# Patient Record
Sex: FEMALE | Race: WHITE | HISPANIC OR LATINO | URBAN - METROPOLITAN AREA
[De-identification: names, ages, dates, MRNs, and addresses within clinical notes are randomized per-mention and may not be internally consistent; named-entity substitution may affect disease eponyms.]

---

## 2017-02-26 ENCOUNTER — EMERGENCY (EMERGENCY)
Facility: HOSPITAL | Age: 27
LOS: 1 days | Discharge: HOME | End: 2017-02-26
Admitting: EMERGENCY MEDICINE
Payer: MEDICAID

## 2017-02-26 PROCEDURE — 73562 X-RAY EXAM OF KNEE 3: CPT | Mod: 26,50

## 2017-02-26 PROCEDURE — 73562 X-RAY EXAM OF KNEE 3: CPT

## 2017-02-26 PROCEDURE — 99284 EMERGENCY DEPT VISIT MOD MDM: CPT | Mod: 25

## 2017-02-26 PROCEDURE — 81025 URINE PREGNANCY TEST: CPT

## 2017-02-26 PROCEDURE — 99284 EMERGENCY DEPT VISIT MOD MDM: CPT

## 2018-07-06 ENCOUNTER — TRANSCRIPTION ENCOUNTER (OUTPATIENT)
Age: 28
End: 2018-07-06

## 2019-08-21 ENCOUNTER — TRANSCRIPTION ENCOUNTER (OUTPATIENT)
Age: 29
End: 2019-08-21

## 2019-10-08 ENCOUNTER — RESULT REVIEW (OUTPATIENT)
Age: 29
End: 2019-10-08

## 2019-10-08 ENCOUNTER — TRANSCRIPTION ENCOUNTER (OUTPATIENT)
Age: 29
End: 2019-10-08

## 2019-12-29 ENCOUNTER — TRANSCRIPTION ENCOUNTER (OUTPATIENT)
Age: 29
End: 2019-12-29

## 2020-01-04 ENCOUNTER — TRANSCRIPTION ENCOUNTER (OUTPATIENT)
Age: 30
End: 2020-01-04

## 2020-02-25 ENCOUNTER — TRANSCRIPTION ENCOUNTER (OUTPATIENT)
Age: 30
End: 2020-02-25

## 2020-04-25 ENCOUNTER — MESSAGE (OUTPATIENT)
Age: 30
End: 2020-04-25

## 2020-05-03 LAB
SARS-COV-2 IGG SERPL IA-ACNC: 2.6 AU/ML
SARS-COV-2 IGG SERPL QL IA: NEGATIVE

## 2020-06-10 ENCOUNTER — RESULT REVIEW (OUTPATIENT)
Age: 30
End: 2020-06-10

## 2020-06-12 ENCOUNTER — TRANSCRIPTION ENCOUNTER (OUTPATIENT)
Age: 30
End: 2020-06-12

## 2020-07-06 ENCOUNTER — ASOB RESULT (OUTPATIENT)
Age: 30
End: 2020-07-06

## 2020-07-06 ENCOUNTER — TRANSCRIPTION ENCOUNTER (OUTPATIENT)
Age: 30
End: 2020-07-06

## 2020-07-06 ENCOUNTER — APPOINTMENT (OUTPATIENT)
Dept: ANTEPARTUM | Facility: CLINIC | Age: 30
End: 2020-07-06
Payer: COMMERCIAL

## 2020-07-06 PROCEDURE — 36416 COLLJ CAPILLARY BLOOD SPEC: CPT

## 2020-07-06 PROCEDURE — 76801 OB US < 14 WKS SINGLE FETUS: CPT

## 2020-07-06 PROCEDURE — 76813 OB US NUCHAL MEAS 1 GEST: CPT | Mod: 59

## 2020-08-13 ENCOUNTER — EMERGENCY (EMERGENCY)
Facility: HOSPITAL | Age: 30
LOS: 1 days | Discharge: HOME | End: 2020-08-13
Attending: EMERGENCY MEDICINE
Payer: COMMERCIAL

## 2020-08-13 VITALS
OXYGEN SATURATION: 100 % | HEART RATE: 89 BPM | SYSTOLIC BLOOD PRESSURE: 131 MMHG | RESPIRATION RATE: 18 BRPM | TEMPERATURE: 98 F | HEIGHT: 61 IN | DIASTOLIC BLOOD PRESSURE: 83 MMHG | WEIGHT: 132.06 LBS

## 2020-08-13 LAB
ALBUMIN SERPL ELPH-MCNC: 3.7 G/DL — SIGNIFICANT CHANGE UP (ref 3.3–5)
ALP SERPL-CCNC: 67 U/L — SIGNIFICANT CHANGE UP (ref 40–120)
ALT FLD-CCNC: 25 U/L — SIGNIFICANT CHANGE UP (ref 10–45)
ANION GAP SERPL CALC-SCNC: 11 MMOL/L — SIGNIFICANT CHANGE UP (ref 5–17)
APPEARANCE UR: ABNORMAL
AST SERPL-CCNC: 22 U/L — SIGNIFICANT CHANGE UP (ref 10–40)
BACTERIA # UR AUTO: ABNORMAL
BASOPHILS # BLD AUTO: 0.05 K/UL — SIGNIFICANT CHANGE UP (ref 0–0.2)
BASOPHILS NFR BLD AUTO: 0.3 % — SIGNIFICANT CHANGE UP (ref 0–2)
BILIRUB SERPL-MCNC: 0.6 MG/DL — SIGNIFICANT CHANGE UP (ref 0.2–1.2)
BILIRUB UR-MCNC: NEGATIVE — SIGNIFICANT CHANGE UP
BUN SERPL-MCNC: 5 MG/DL — LOW (ref 7–23)
CALCIUM SERPL-MCNC: 9.5 MG/DL — SIGNIFICANT CHANGE UP (ref 8.4–10.5)
CHLORIDE SERPL-SCNC: 103 MMOL/L — SIGNIFICANT CHANGE UP (ref 96–108)
CO2 SERPL-SCNC: 20 MMOL/L — LOW (ref 22–31)
COLOR SPEC: SIGNIFICANT CHANGE UP
CREAT SERPL-MCNC: 0.46 MG/DL — LOW (ref 0.5–1.3)
DIFF PNL FLD: ABNORMAL
EOSINOPHIL # BLD AUTO: 0.15 K/UL — SIGNIFICANT CHANGE UP (ref 0–0.5)
EOSINOPHIL NFR BLD AUTO: 1 % — SIGNIFICANT CHANGE UP (ref 0–6)
EPI CELLS # UR: 0 /HPF — SIGNIFICANT CHANGE UP
GLUCOSE SERPL-MCNC: 84 MG/DL — SIGNIFICANT CHANGE UP (ref 70–99)
GLUCOSE UR QL: NEGATIVE — SIGNIFICANT CHANGE UP
HCT VFR BLD CALC: 37.7 % — SIGNIFICANT CHANGE UP (ref 34.5–45)
HGB BLD-MCNC: 12.8 G/DL — SIGNIFICANT CHANGE UP (ref 11.5–15.5)
HYALINE CASTS # UR AUTO: 1 /LPF — SIGNIFICANT CHANGE UP (ref 0–2)
IMM GRANULOCYTES NFR BLD AUTO: 0.5 % — SIGNIFICANT CHANGE UP (ref 0–1.5)
KETONES UR-MCNC: NEGATIVE — SIGNIFICANT CHANGE UP
LEUKOCYTE ESTERASE UR-ACNC: ABNORMAL
LYMPHOCYTES # BLD AUTO: 1.69 K/UL — SIGNIFICANT CHANGE UP (ref 1–3.3)
LYMPHOCYTES # BLD AUTO: 10.9 % — LOW (ref 13–44)
MAGNESIUM SERPL-MCNC: 1.8 MG/DL — SIGNIFICANT CHANGE UP (ref 1.6–2.6)
MCHC RBC-ENTMCNC: 29.5 PG — SIGNIFICANT CHANGE UP (ref 27–34)
MCHC RBC-ENTMCNC: 34 GM/DL — SIGNIFICANT CHANGE UP (ref 32–36)
MCV RBC AUTO: 86.9 FL — SIGNIFICANT CHANGE UP (ref 80–100)
MONOCYTES # BLD AUTO: 0.89 K/UL — SIGNIFICANT CHANGE UP (ref 0–0.9)
MONOCYTES NFR BLD AUTO: 5.7 % — SIGNIFICANT CHANGE UP (ref 2–14)
NEUTROPHILS # BLD AUTO: 12.63 K/UL — HIGH (ref 1.8–7.4)
NEUTROPHILS NFR BLD AUTO: 81.6 % — HIGH (ref 43–77)
NITRITE UR-MCNC: NEGATIVE — SIGNIFICANT CHANGE UP
NRBC # BLD: 0 /100 WBCS — SIGNIFICANT CHANGE UP (ref 0–0)
PH UR: 7 — SIGNIFICANT CHANGE UP (ref 5–8)
PHOSPHATE SERPL-MCNC: 2.6 MG/DL — SIGNIFICANT CHANGE UP (ref 2.5–4.5)
PLATELET # BLD AUTO: 248 K/UL — SIGNIFICANT CHANGE UP (ref 150–400)
POTASSIUM SERPL-MCNC: 3.6 MMOL/L — SIGNIFICANT CHANGE UP (ref 3.5–5.3)
POTASSIUM SERPL-SCNC: 3.6 MMOL/L — SIGNIFICANT CHANGE UP (ref 3.5–5.3)
PROT SERPL-MCNC: 6.8 G/DL — SIGNIFICANT CHANGE UP (ref 6–8.3)
PROT UR-MCNC: ABNORMAL
RBC # BLD: 4.34 M/UL — SIGNIFICANT CHANGE UP (ref 3.8–5.2)
RBC # FLD: 12.5 % — SIGNIFICANT CHANGE UP (ref 10.3–14.5)
RBC CASTS # UR COMP ASSIST: 3 /HPF — SIGNIFICANT CHANGE UP (ref 0–4)
SARS-COV-2 RNA SPEC QL NAA+PROBE: SIGNIFICANT CHANGE UP
SODIUM SERPL-SCNC: 134 MMOL/L — LOW (ref 135–145)
SP GR SPEC: 1.01 — LOW (ref 1.01–1.02)
UROBILINOGEN FLD QL: NEGATIVE — SIGNIFICANT CHANGE UP
WBC # BLD: 15.48 K/UL — HIGH (ref 3.8–10.5)
WBC # FLD AUTO: 15.48 K/UL — HIGH (ref 3.8–10.5)
WBC UR QL: 66 /HPF — HIGH (ref 0–5)

## 2020-08-13 PROCEDURE — 76815 OB US LIMITED FETUS(S): CPT | Mod: 26

## 2020-08-13 PROCEDURE — 99220: CPT

## 2020-08-13 PROCEDURE — 76770 US EXAM ABDO BACK WALL COMP: CPT | Mod: 26

## 2020-08-13 RX ORDER — ONDANSETRON 8 MG/1
4 TABLET, FILM COATED ORAL ONCE
Refills: 0 | Status: COMPLETED | OUTPATIENT
Start: 2020-08-13 | End: 2020-08-13

## 2020-08-13 RX ORDER — SODIUM CHLORIDE 9 MG/ML
1000 INJECTION INTRAMUSCULAR; INTRAVENOUS; SUBCUTANEOUS ONCE
Refills: 0 | Status: COMPLETED | OUTPATIENT
Start: 2020-08-13 | End: 2020-08-13

## 2020-08-13 RX ORDER — ACETAMINOPHEN 500 MG
975 TABLET ORAL EVERY 6 HOURS
Refills: 0 | Status: DISCONTINUED | OUTPATIENT
Start: 2020-08-13 | End: 2020-08-17

## 2020-08-13 RX ORDER — SODIUM CHLORIDE 9 MG/ML
1000 INJECTION INTRAMUSCULAR; INTRAVENOUS; SUBCUTANEOUS
Refills: 0 | Status: DISCONTINUED | OUTPATIENT
Start: 2020-08-13 | End: 2020-08-17

## 2020-08-13 RX ORDER — ACETAMINOPHEN 500 MG
975 TABLET ORAL ONCE
Refills: 0 | Status: COMPLETED | OUTPATIENT
Start: 2020-08-13 | End: 2020-08-13

## 2020-08-13 RX ORDER — CEFTRIAXONE 500 MG/1
1000 INJECTION, POWDER, FOR SOLUTION INTRAMUSCULAR; INTRAVENOUS EVERY 12 HOURS
Refills: 0 | Status: DISCONTINUED | OUTPATIENT
Start: 2020-08-13 | End: 2020-08-17

## 2020-08-13 RX ADMIN — Medication 975 MILLIGRAM(S): at 22:58

## 2020-08-13 RX ADMIN — CEFTRIAXONE 1000 MILLIGRAM(S): 500 INJECTION, POWDER, FOR SOLUTION INTRAMUSCULAR; INTRAVENOUS at 14:56

## 2020-08-13 RX ADMIN — Medication 975 MILLIGRAM(S): at 14:03

## 2020-08-13 RX ADMIN — SODIUM CHLORIDE 1000 MILLILITER(S): 9 INJECTION INTRAMUSCULAR; INTRAVENOUS; SUBCUTANEOUS at 14:03

## 2020-08-13 RX ADMIN — Medication 975 MILLIGRAM(S): at 14:57

## 2020-08-13 RX ADMIN — ONDANSETRON 4 MILLIGRAM(S): 8 TABLET, FILM COATED ORAL at 14:03

## 2020-08-13 RX ADMIN — CEFTRIAXONE 100 MILLIGRAM(S): 500 INJECTION, POWDER, FOR SOLUTION INTRAMUSCULAR; INTRAVENOUS at 14:05

## 2020-08-13 RX ADMIN — SODIUM CHLORIDE 1000 MILLILITER(S): 9 INJECTION INTRAMUSCULAR; INTRAVENOUS; SUBCUTANEOUS at 16:43

## 2020-08-13 RX ADMIN — SODIUM CHLORIDE 150 MILLILITER(S): 9 INJECTION INTRAMUSCULAR; INTRAVENOUS; SUBCUTANEOUS at 22:19

## 2020-08-13 RX ADMIN — SODIUM CHLORIDE 150 MILLILITER(S): 9 INJECTION INTRAMUSCULAR; INTRAVENOUS; SUBCUTANEOUS at 16:48

## 2020-08-13 RX ADMIN — Medication 975 MILLIGRAM(S): at 22:19

## 2020-08-13 NOTE — ED ADULT TRIAGE NOTE - CHIEF COMPLAINT QUOTE
flank pain, hematuria,  frequent urination since yesterday, history of pyelonephritis, 17 weeks pregnant, denies vaginal bleed

## 2020-08-13 NOTE — ED PROVIDER NOTE - FAMILY HISTORY
Father  Still living? Yes, Estimated age: Age Unknown  Family history of hypertension, Age at diagnosis: Age Unknown  Family history of hyperlipidemia, Age at diagnosis: Age Unknown  Family history of diabetes mellitus in father, Age at diagnosis: Age Unknown

## 2020-08-13 NOTE — ED CDU PROVIDER INITIAL DAY NOTE - MEDICAL DECISION MAKING DETAILS
Armida Macario MD  29 yr old female 17 weeks and 6 days pregnant with UA and US consistent with a UTI concern for pyelonephritis, specially in a pregnant patient will treat with ceftriaxone and place in CDU for further evaluation, treatment, and ressessment.

## 2020-08-13 NOTE — ED PROVIDER NOTE - ATTENDING CONTRIBUTION TO CARE
I performed a history and physical exam of the patient and discussed their management with the ACP. I reviewed the ACP's note and agree with the documented findings and plan of care.  Armida Macario MD

## 2020-08-13 NOTE — ED CDU PROVIDER DISPOSITION NOTE - NSFOLLOWUPINSTRUCTIONS_ED_ALL_ED_FT
1) Follow-up with your primary care provider within 2-3 days.     2) Bring all printed results to discuss with your PCP.    3) Take your antibiotic as directed. Finish the full course of antibiotics do not skip doses or stop early if you start feeling better.    4) Pain can be managed with Acetaminophen (aka Tylenol) 1000mg (2 extra strength tablets) every 6 hours and/or ibuprofen (aka Motrin or Advil) 400mg (2 regular strength tablets) every 6 hours.    5) Continue to take all other medications as directed.    6) Return to the emergency room immediately if your symptoms worsen or persist, or if you have fever, chills, worsening flank pain despite pain medications, inability to urinate, vomiting, unable to keep food or water down, loss of consciousness (passing out), rashes, or if any other concerning or questionable symptoms. 1) Follow-up with your primary care provider and OBGYN within 2-3 days.     2) Bring all printed results to discuss with your PCP.    3) Take your antibiotic as directed. Finish the full course of antibiotics do not skip doses or stop early if you start feeling better.    4) Pain can be managed with Acetaminophen (aka Tylenol) 1000mg (2 extra strength tablets) every 6 hours    5) Continue to take all other medications as directed.    6) Return to the emergency room immediately if your symptoms worsen or persist, or if you have fever, chills, worsening flank pain despite pain medications, inability to urinate, vomiting, unable to keep food or water down, loss of consciousness (passing out), rashes, or if any other concerning or questionable symptoms.

## 2020-08-13 NOTE — ED CDU PROVIDER DISPOSITION NOTE - PATIENT PORTAL LINK FT
You can access the FollowMyHealth Patient Portal offered by [facility] by registering at the following website: [email or web address]. By joining [facility]’s FollowMyHealth portal, you will also be able to view your health information using other applications (apps) compatible with our system.

## 2020-08-13 NOTE — ED PROVIDER NOTE - PROGRESS NOTE DETAILS
MARIAN Rodriguez: paged OB Dr. Bea Grover Oh awaiting call back MARIAN Rodriguez: spoke to OB Dr. Bea Gutierres who agreed with the choice of IV abx, w/u and further monitoring in CDU. Made patient aware discussed case with her OB and she agrees with plan MARIAN Rodriguez: improvement of nausea and pain after tylenol and zofran given. pelvic US single viable intrauterine pregnancy and US kidneys shows no hydro and debris in bladder consistent with UTI. pending COVID results and then dispo to CDU Armida Macario MD  UA and US consistent with a UTI concern for pyelonephritis, specially in a pregnant patient will treat with ceftriaxone and place in CDU for further evaluation and treatment. Patient was signed out to Dr. Stern, pending the Covid results before the patient can be palced in the CDU.

## 2020-08-13 NOTE — ED PROVIDER NOTE - OBJECTIVE STATEMENT
28 y/o female with no PMHx  currently 17 weeks 6 days pregnant no PSx presented to the c/o bilateral flank pain, nausea, dysuria, urinary freq, and hematuria x1 day. Patient reports the pain is severe 10/10 "feels sharp shooting" "like contractions". Has not taken medication for pain or previous antibiotics. Feels similar to pyelo in past pregnancies. 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 Patient denied CP, SOB, vomiting, diarrhea, fever chills, cough, headache, dizziness, weakness, vaginal bleeding, pelvic pain. Mother has no medical problems. Father PMHx HTN, HLD, DM    Obstetrics: Bea Gutierres MD - [facility]

## 2020-08-13 NOTE — ED CDU PROVIDER DISPOSITION NOTE - ATTENDING CONTRIBUTION TO CARE
I have personally performed a face to face medical and diagnostic evaluation of the patient. I have discussed with and reviewed the ACP's note and agree with the History, ROS, Physical Exam and MDM unless otherwise indicated. A brief summary of my personal evaluation and impression can be found below.    Note from previous day: 28 y/o female with no PMHx  currently 17 weeks 6 days pregnant no PSx presented to the c/o bilateral flank pain, nausea, dysuria, urinary freq, and hematuria x1 day. Patient reports the pain is severe 10/10 "feels sharp shooting" "like contractions". Has not taken medication for pain or previous antibiotics. Feels similar to pyelo in past pregnancies. 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 Patient denied CP, SOB, vomiting, diarrhea, fever chills, cough, headache, dizziness, weakness, vaginal bleeding, pelvic pain. Mother has no medical problems. Father PMHx HTN, HLD, DM    	Obstetrics: Bea Gutierres MD - [facility]    Pt sent to CDU for c/f pyelonephritis for IV ABx and continued monitoring. On interval pt reports no acute events overnight, feels well, reports APAP has improved pain, tolerated breakfast this morning, feels well and is asking to be discharged. At present, denies n/v/f/c/cp/sob. Denies headache, syncope, lightheadedness, dizziness. Denies chest palpitations, abdominal pain. Denies edema. Denies dysuria, hematuria, BRBPR, tarry stools, diarrhea, constipation.     All other ROS negative, except as above and as per HPI and ROS section.    GEN APPEARANCE: WDWN, alert, non-toxic, NAD  HEAD: Atraumatic.  EYES: PERRLa, EOMI, vision grossly intact.   NECK: Supple  CV: RRR, S1S2, no c/r/m/g. Cap refill <2 seconds. No bruits.   LUNGS: CTAB. No abnormal breath sounds.  ABDOMEN: Soft, NTND. No guarding or rebound.   MSK/EXT: No spinal or extremity point tenderness. No CVA ttp. Pelvis stable. No peripheral edema.  NEURO: Alert, follows commands. Weight bearing normal. Speech normal. Sensation and motor normal x4 extremities.   SKIN: Warm, dry and intact. No rash.  PSYCH: Appropriate    Plan/MDM: 29F no pmh currently 17w preg presented with urinary sx and b/l flank pain with a positive UA c/f pyelonephritis, sent to CDU for IV ABx and monitoring, feels improved overnight tolerated PO, pt requesting discharge, pt to follow up with OBGYN discussed pt on APAP for pain mgmt, will fu w PB and PMD. Pt reassessed at bedside, feels well, pain controlled. Informed of workup in ED, reviewed lab and/or radiology results (when applicable) with patient/caregiver. Informed pt of plan for discharge with instructions to follow up with PMD. Pt/caregiver expressed understanding of plan and agrees with plan for discharge. Strict return precautions discussed with patient in layman's terms, patient demonstrated understanding of return precautions.

## 2020-08-13 NOTE — ED CDU PROVIDER INITIAL DAY NOTE - PROGRESS NOTE DETAILS
Pt still in ED, awaiting results of Covid swab. OB US w/ viable IUP and Renal US with fullness right collecting system w/ bladder debris concerning for cystitis. Will continue plan as previously established and await arrival to cdu   Cecilia Alfaro PA-C Pt arrived in CDU, pt feeling well, states dysuria improved, hematuria resolved, and flank pain greatly improved. Pt denies f/c, n/v/d, VB, LOF. Abd soft, NT, no CVAT. Will continue to monitor.

## 2020-08-13 NOTE — ED CDU PROVIDER DISPOSITION NOTE - CLINICAL COURSE
28 y/o female with no PMHx  currently 17 weeks 6 days pregnant no PSx presented to the c/o bilateral flank pain, nausea, dysuria, urinary freq, and hematuria x1 day. Patient reports the pain is severe 10/10 "feels sharp shooting" "like contractions". Has not taken medication for pain or previous antibiotics. Feels similar to pyelo in past pregnancies. 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 Patient denied CP, SOB, vomiting, diarrhea, fever chills, cough, headache, dizziness, weakness, vaginal bleeding, pelvic pain. Mother has no medical problems. Father PMHx HTN, HLD, DM  	In ED patient labs sig for leukocytosis of 15.4. UA appeared +. Pt started on Ceftriaxone q 12. ED PA spoke to pt GYN who agreed with plan for iv abx and observation. Pending results US Kidney and Fetal US.   //    Obstetrics: Bea Gutierres MD - [facility]  CDU Course: ___ 28 y/o female with no PMHx  currently 17 weeks 6 days pregnant no PSx presented to the c/o bilateral flank pain, nausea, dysuria, urinary freq, and hematuria x1 day. Patient reports the pain is severe 10/10 "feels sharp shooting" "like contractions". Has not taken medication for pain or previous antibiotics. Feels similar to pyelo in past pregnancies. 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 Patient denied CP, SOB, vomiting, diarrhea, fever chills, cough, headache, dizziness, weakness, vaginal bleeding, pelvic pain. Mother has no medical problems. Father PMHx HTN, HLD, DM  	In ED patient labs sig for leukocytosis of 15.4. UA appeared +. Pt started on Ceftriaxone q 12. ED PA spoke to pt GYN who agreed with plan for iv abx and observation. Pending results US Kidney and Fetal US.   //    Obstetrics: Bea Gutierres MD - [facility]  In the CDU patient responded well.  Pain improved.  Leukocytosis resolved.  Patient to follow up with her PMD and OBGYN.

## 2020-08-13 NOTE — ED CDU PROVIDER INITIAL DAY NOTE - OBJECTIVE STATEMENT
30 y/o female with no PMHx  currently 17 weeks 6 days pregnant no PSx presented to the c/o bilateral flank pain, nausea, dysuria, urinary freq, and hematuria x1 day. Patient reports the pain is severe 10/10 "feels sharp shooting" "like contractions". Has not taken medication for pain or previous antibiotics. Feels similar to pyelo in past pregnancies. 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 Patient denied CP, SOB, vomiting, diarrhea, fever chills, cough, headache, dizziness, weakness, vaginal bleeding, pelvic pain. Mother has no medical problems. Father PMHx HTN, HLD, DM  In ED patient labs sig for leukocytosis of 15.4. UA appeared +. Pt started on Ceftriaxone q 12. ED PA spoke to pt GYN who agreed with plan for iv abx and observation. Pending results US Kidney and Fetal US.     Obstetrics: Bea Gutierres MD - [facility]

## 2020-08-13 NOTE — ED PROVIDER NOTE - CLINICAL SUMMARY MEDICAL DECISION MAKING FREE TEXT BOX
Armida Macario MD  29 yr old female 17 weeks 6days pregnant presenting to the ED with bilateral flank pain, with urinary symptoms of dysuria, and frequency, associated with nausea, no fever. R/o pyelonephritis, r/o infected kidney stone; Plan: labs, US to evaluate the pregnancy and the kidneys, UA, UC&S, IV fluids, nausea meds, reassessment.

## 2020-08-14 VITALS
SYSTOLIC BLOOD PRESSURE: 101 MMHG | DIASTOLIC BLOOD PRESSURE: 68 MMHG | OXYGEN SATURATION: 100 % | RESPIRATION RATE: 18 BRPM | TEMPERATURE: 98 F | HEART RATE: 61 BPM

## 2020-08-14 LAB
ALBUMIN SERPL ELPH-MCNC: 3 G/DL — LOW (ref 3.3–5)
ALP SERPL-CCNC: 58 U/L — SIGNIFICANT CHANGE UP (ref 40–120)
ALT FLD-CCNC: 18 U/L — SIGNIFICANT CHANGE UP (ref 10–45)
ANION GAP SERPL CALC-SCNC: 9 MMOL/L — SIGNIFICANT CHANGE UP (ref 5–17)
AST SERPL-CCNC: 16 U/L — SIGNIFICANT CHANGE UP (ref 10–40)
BASOPHILS # BLD AUTO: 0.05 K/UL — SIGNIFICANT CHANGE UP (ref 0–0.2)
BASOPHILS NFR BLD AUTO: 0.5 % — SIGNIFICANT CHANGE UP (ref 0–2)
BILIRUB SERPL-MCNC: 0.3 MG/DL — SIGNIFICANT CHANGE UP (ref 0.2–1.2)
BUN SERPL-MCNC: 6 MG/DL — LOW (ref 7–23)
CALCIUM SERPL-MCNC: 8.2 MG/DL — LOW (ref 8.4–10.5)
CHLORIDE SERPL-SCNC: 107 MMOL/L — SIGNIFICANT CHANGE UP (ref 96–108)
CO2 SERPL-SCNC: 21 MMOL/L — LOW (ref 22–31)
CREAT SERPL-MCNC: 0.55 MG/DL — SIGNIFICANT CHANGE UP (ref 0.5–1.3)
EOSINOPHIL # BLD AUTO: 0.31 K/UL — SIGNIFICANT CHANGE UP (ref 0–0.5)
EOSINOPHIL NFR BLD AUTO: 3.1 % — SIGNIFICANT CHANGE UP (ref 0–6)
GLUCOSE SERPL-MCNC: 87 MG/DL — SIGNIFICANT CHANGE UP (ref 70–99)
HCT VFR BLD CALC: 34.9 % — SIGNIFICANT CHANGE UP (ref 34.5–45)
HGB BLD-MCNC: 11.3 G/DL — LOW (ref 11.5–15.5)
IMM GRANULOCYTES NFR BLD AUTO: 0.5 % — SIGNIFICANT CHANGE UP (ref 0–1.5)
LYMPHOCYTES # BLD AUTO: 2.33 K/UL — SIGNIFICANT CHANGE UP (ref 1–3.3)
LYMPHOCYTES # BLD AUTO: 23.3 % — SIGNIFICANT CHANGE UP (ref 13–44)
MCHC RBC-ENTMCNC: 29 PG — SIGNIFICANT CHANGE UP (ref 27–34)
MCHC RBC-ENTMCNC: 32.4 GM/DL — SIGNIFICANT CHANGE UP (ref 32–36)
MCV RBC AUTO: 89.7 FL — SIGNIFICANT CHANGE UP (ref 80–100)
MONOCYTES # BLD AUTO: 0.78 K/UL — SIGNIFICANT CHANGE UP (ref 0–0.9)
MONOCYTES NFR BLD AUTO: 7.8 % — SIGNIFICANT CHANGE UP (ref 2–14)
NEUTROPHILS # BLD AUTO: 6.5 K/UL — SIGNIFICANT CHANGE UP (ref 1.8–7.4)
NEUTROPHILS NFR BLD AUTO: 64.8 % — SIGNIFICANT CHANGE UP (ref 43–77)
NRBC # BLD: 0 /100 WBCS — SIGNIFICANT CHANGE UP (ref 0–0)
PLATELET # BLD AUTO: 219 K/UL — SIGNIFICANT CHANGE UP (ref 150–400)
POTASSIUM SERPL-MCNC: 4 MMOL/L — SIGNIFICANT CHANGE UP (ref 3.5–5.3)
POTASSIUM SERPL-SCNC: 4 MMOL/L — SIGNIFICANT CHANGE UP (ref 3.5–5.3)
PROT SERPL-MCNC: 5.4 G/DL — LOW (ref 6–8.3)
RBC # BLD: 3.89 M/UL — SIGNIFICANT CHANGE UP (ref 3.8–5.2)
RBC # FLD: 12.7 % — SIGNIFICANT CHANGE UP (ref 10.3–14.5)
SODIUM SERPL-SCNC: 137 MMOL/L — SIGNIFICANT CHANGE UP (ref 135–145)
WBC # BLD: 10.02 K/UL — SIGNIFICANT CHANGE UP (ref 3.8–10.5)
WBC # FLD AUTO: 10.02 K/UL — SIGNIFICANT CHANGE UP (ref 3.8–10.5)

## 2020-08-14 PROCEDURE — 76810 OB US >/= 14 WKS ADDL FETUS: CPT

## 2020-08-14 PROCEDURE — 96375 TX/PRO/DX INJ NEW DRUG ADDON: CPT

## 2020-08-14 PROCEDURE — 85027 COMPLETE CBC AUTOMATED: CPT

## 2020-08-14 PROCEDURE — 81001 URINALYSIS AUTO W/SCOPE: CPT

## 2020-08-14 PROCEDURE — 99217: CPT

## 2020-08-14 PROCEDURE — 87186 SC STD MICRODIL/AGAR DIL: CPT

## 2020-08-14 PROCEDURE — 96365 THER/PROPH/DIAG IV INF INIT: CPT

## 2020-08-14 PROCEDURE — G0378: CPT

## 2020-08-14 PROCEDURE — U0003: CPT

## 2020-08-14 PROCEDURE — 96361 HYDRATE IV INFUSION ADD-ON: CPT

## 2020-08-14 PROCEDURE — 76770 US EXAM ABDO BACK WALL COMP: CPT

## 2020-08-14 PROCEDURE — 80053 COMPREHEN METABOLIC PANEL: CPT

## 2020-08-14 PROCEDURE — 87086 URINE CULTURE/COLONY COUNT: CPT

## 2020-08-14 PROCEDURE — 99284 EMERGENCY DEPT VISIT MOD MDM: CPT | Mod: 25

## 2020-08-14 PROCEDURE — 84100 ASSAY OF PHOSPHORUS: CPT

## 2020-08-14 PROCEDURE — 83735 ASSAY OF MAGNESIUM: CPT

## 2020-08-14 RX ORDER — CEPHALEXIN 500 MG
1 CAPSULE ORAL
Qty: 27 | Refills: 0
Start: 2020-08-14 | End: 2020-08-22

## 2020-08-14 RX ADMIN — SODIUM CHLORIDE 150 MILLILITER(S): 9 INJECTION INTRAMUSCULAR; INTRAVENOUS; SUBCUTANEOUS at 06:16

## 2020-08-14 RX ADMIN — CEFTRIAXONE 100 MILLIGRAM(S): 500 INJECTION, POWDER, FOR SOLUTION INTRAMUSCULAR; INTRAVENOUS at 01:45

## 2020-08-14 RX ADMIN — Medication 975 MILLIGRAM(S): at 04:20

## 2020-08-14 NOTE — ED CDU PROVIDER SUBSEQUENT DAY NOTE - ATTENDING CONTRIBUTION TO CARE
I have personally performed a face to face medical and diagnostic evaluation of the patient. I have discussed with and reviewed the ACP's note and agree with the History, ROS, Physical Exam and MDM unless otherwise indicated. A brief summary of my personal evaluation and impression can be found below.    Note from previous day: 28 y/o female with no PMHx  currently 17 weeks 6 days pregnant no PSx presented to the c/o bilateral flank pain, nausea, dysuria, urinary freq, and hematuria x1 day. Patient reports the pain is severe 10/10 "feels sharp shooting" "like contractions". Has not taken medication for pain or previous antibiotics. Feels similar to pyelo in past pregnancies. 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 Patient denied CP, SOB, vomiting, diarrhea, fever chills, cough, headache, dizziness, weakness, vaginal bleeding, pelvic pain. Mother has no medical problems. Father PMHx HTN, HLD, DM    	Obstetrics: Bea Gutierres MD - [facility]    Pt sent to CDU for c/f pyelonephritis for IV ABx and continued monitoring. On interval pt reports no acute events overnight, feels well, reports APAP has improved pain, tolerated breakfast this morning, feels well and is asking to be discharged. At present, denies n/v/f/c/cp/sob. Denies headache, syncope, lightheadedness, dizziness. Denies chest palpitations, abdominal pain. Denies edema. Denies dysuria, hematuria, BRBPR, tarry stools, diarrhea, constipation.     All other ROS negative, except as above and as per HPI and ROS section.    GEN APPEARANCE: WDWN, alert, non-toxic, NAD  HEAD: Atraumatic.  EYES: PERRLa, EOMI, vision grossly intact.   NECK: Supple  CV: RRR, S1S2, no c/r/m/g. Cap refill <2 seconds. No bruits.   LUNGS: CTAB. No abnormal breath sounds.  ABDOMEN: Soft, NTND. No guarding or rebound.   MSK/EXT: No spinal or extremity point tenderness. No CVA ttp. Pelvis stable. No peripheral edema.  NEURO: Alert, follows commands. Weight bearing normal. Speech normal. Sensation and motor normal x4 extremities.   SKIN: Warm, dry and intact. No rash.  PSYCH: Appropriate    Plan/MDM: 29F no pmh currently 17w preg presented with urinary sx and b/l flank pain with a positive UA c/f pyelonephritis, sent to CDU for IV ABx and monitoring, feels improved overnight tolerated PO, pt requesting discharge, pt to follow up with OBGYN discussed pt on APAP for pain mgmt, will fu w PB and PMD. Pt reassessed at bedside, feels well, pain controlled. Informed of workup in ED, reviewed lab and/or radiology results (when applicable) with patient/caregiver. Informed pt of plan for discharge with instructions to follow up with PMD. Pt/caregiver expressed understanding of plan and agrees with plan for discharge. Strict return precautions discussed with patient in layman's terms, patient demonstrated understanding of return precautions.

## 2020-08-14 NOTE — ED ADULT NURSE REASSESSMENT NOTE - NS ED NURSE REASSESS COMMENT FT1
Report taken from Jessie CONNELLY. States patient had a good night with no complaints. Will continue to monitor.
Pt received from GODWIN Isidro. Pt oriented to CDU & plan of care was discussed. Pt states she is feeling better. Pt denies any flank pain. Pt denies any urgency or frequency but states that she still has some burning with urination. Pt medicated for 5/10 headache. Safety & comfort measures maintained. Call bell in reach. Will continue to monitor.

## 2020-08-14 NOTE — ED CDU PROVIDER SUBSEQUENT DAY NOTE - PHYSICAL EXAMINATION
GEN: Pt in NAD, non-toxic.  PSYCH: Affect appropriate.  EYES: Sclera white w/o injection.   ENT: Head NCAT. MMM. Neck supple FROM.  RESP: CTA b/l, no wheezes, rales, or rhonchi.   CARDIAC: RRR, clear distinct S1, S2, no appreciable murmurs.  ABD: Abdomen gravid, non-tender. No CVAT b/l.  VASC: No edema or calf tenderness.  SKIN: No rashes on the trunk.

## 2020-08-14 NOTE — ED CDU PROVIDER SUBSEQUENT DAY NOTE - PROGRESS NOTE DETAILS
Pt seen at bedside. Pt in NAD. VS stable from last reading. Pt noted recurrence of flank pain requiring Tylenol, will continue to monitor. Patient seen at bedside in NAD.  VSS.  Patient resting comfortably without complaints. Patient reports that she had some pain overnight, which improved with tylenol.  Afebrile. Leukocytosis resolved. Plan for DC this morning.  All lab and radiology findings discussed with patient.  Strict return precautions and follow up instructions provided.  -Fernando Fong PA-C

## 2020-08-15 NOTE — ED POST DISCHARGE NOTE - RESULT SUMMARY
UCX PRELIM >100k E. coli, pt DC on Keflex, pending sensitivities to determine if need for call back vs appropriate care received. -Gonzales Weinberg PA-C

## 2020-09-01 ENCOUNTER — APPOINTMENT (OUTPATIENT)
Dept: ANTEPARTUM | Facility: CLINIC | Age: 30
End: 2020-09-01
Payer: COMMERCIAL

## 2020-09-01 ENCOUNTER — ASOB RESULT (OUTPATIENT)
Age: 30
End: 2020-09-01

## 2020-09-01 PROCEDURE — 76817 TRANSVAGINAL US OBSTETRIC: CPT

## 2020-09-01 PROCEDURE — 76811 OB US DETAILED SNGL FETUS: CPT

## 2020-12-18 ENCOUNTER — ASOB RESULT (OUTPATIENT)
Age: 30
End: 2020-12-18

## 2020-12-18 ENCOUNTER — APPOINTMENT (OUTPATIENT)
Dept: ANTEPARTUM | Facility: CLINIC | Age: 30
End: 2020-12-18
Payer: COMMERCIAL

## 2020-12-18 PROCEDURE — 76816 OB US FOLLOW-UP PER FETUS: CPT

## 2020-12-18 PROCEDURE — 99072 ADDL SUPL MATRL&STAF TM PHE: CPT

## 2020-12-26 ENCOUNTER — OUTPATIENT (OUTPATIENT)
Dept: OUTPATIENT SERVICES | Facility: HOSPITAL | Age: 30
LOS: 1 days | End: 2020-12-26
Payer: COMMERCIAL

## 2020-12-26 VITALS
TEMPERATURE: 99 F | HEART RATE: 85 BPM | DIASTOLIC BLOOD PRESSURE: 79 MMHG | RESPIRATION RATE: 19 BRPM | SYSTOLIC BLOOD PRESSURE: 120 MMHG

## 2020-12-26 VITALS
RESPIRATION RATE: 16 BRPM | TEMPERATURE: 98 F | DIASTOLIC BLOOD PRESSURE: 79 MMHG | SYSTOLIC BLOOD PRESSURE: 120 MMHG | HEART RATE: 58 BPM

## 2020-12-26 DIAGNOSIS — O26.899 OTHER SPECIFIED PREGNANCY RELATED CONDITIONS, UNSPECIFIED TRIMESTER: ICD-10-CM

## 2020-12-26 DIAGNOSIS — Z3A.00 WEEKS OF GESTATION OF PREGNANCY NOT SPECIFIED: ICD-10-CM

## 2020-12-26 PROCEDURE — G0463: CPT

## 2020-12-26 PROCEDURE — 59025 FETAL NON-STRESS TEST: CPT

## 2020-12-26 NOTE — OB PROVIDER TRIAGE NOTE - NSHPPHYSICALEXAM_GEN_ALL_CORE
Objective  – Vital Signs  Vital Signs Last 24 Hrs  T(C): 36.9 (26 Dec 2020 20:23), Max: 37.0 (26 Dec 2020 18:43)  T(F): 98.42 (26 Dec 2020 20:23), Max: 98.6 (26 Dec 2020 18:43)  HR: 58 (26 Dec 2020 20:23) (55 - 85)  BP: 120/79 (26 Dec 2020 20:23) (120/79 - 120/79)  RR: 16 (26 Dec 2020 20:23) (16 - 19)  SpO2: 98% (26 Dec 2020 20:19) (97% - 100%)    – PE:   CV: RRR  Pulm: breathing comfortably on RA  Abd: gravid, nontender  Extr: moving all extremities with ease  – FHT: baseline 135, mod variability, +accels, -decels  – [place]: irritability  – EFW: 2807g by sono  – [place]: vertex

## 2020-12-26 NOTE — OB PROVIDER TRIAGE NOTE - HISTORY OF PRESENT ILLNESS
Backnote from 6pm  clinical responsibilities     Triage H&P    Subjective  HPI: 31yo  @37+1 presents for chief concern of decreased fetal movement. She states that she has not felt fetal movement since 10pm on . However, since presenting to L&D, she notes that she has felt fetal movement. -LOF. -CTXs. -VB. Pt denies any other concerns.    – PNC: Denies prenatal issues. GBS unknown, but GBS positive in prior pregnancies.  EFW 2807g   – OBHx: 2007-1st trimester ETOP s/p D&C, 2016-SAB @7wk, no D&C, 2016-, 5#8oz  – GynHx: HPV+, s/p colposcopy  – PMH: denies  – PSH: D&C  – Psych: situational anxiety, denies SI/HI   – Social: denies   – Meds: none, does not take PNV because she forgets to take them daily  – Allergies: NKDA  – Will accept blood transfusions? Yes

## 2020-12-26 NOTE — OB PROVIDER TRIAGE NOTE - NSOBPROVIDERNOTE_OBGYN_ALL_OB_FT
Assessment: 29yo  @37+1 presents for chief concern of decreased fetal movement. While in triage, patient noted that she has felt fetal movement. Patient stable and doing well. No prenatal issues. GBS unknown.    Plan  1. NST reactive, BPP   2. Patient stable, not in labor  3. Patient stable for dispo to home  4. Labor precautions: kick counts, return to L&D if experiencing severely painful contractions, decreased fetal movement, leakage of fluid, or vaginal bleeding    Patient discussed with attending physician, Dr. Coronel.  Iliana Cortez MD PGY1

## 2020-12-26 NOTE — OB PROVIDER TRIAGE NOTE - PLAN OF CARE
Labor precautions: kick counts, return to L&D if experiencing severely painful contractions, decreased fetal movement, leakage of fluid, or vaginal bleeding

## 2021-01-06 ENCOUNTER — TRANSCRIPTION ENCOUNTER (OUTPATIENT)
Age: 31
End: 2021-01-06

## 2021-01-09 ENCOUNTER — INPATIENT (INPATIENT)
Facility: HOSPITAL | Age: 31
LOS: 1 days | Discharge: HOME | End: 2021-01-11
Attending: OBSTETRICS & GYNECOLOGY | Admitting: OBSTETRICS & GYNECOLOGY
Payer: COMMERCIAL

## 2021-01-09 VITALS — HEART RATE: 67 BPM | SYSTOLIC BLOOD PRESSURE: 127 MMHG | DIASTOLIC BLOOD PRESSURE: 79 MMHG

## 2021-01-09 DIAGNOSIS — Z34.80 ENCOUNTER FOR SUPERVISION OF OTHER NORMAL PREGNANCY, UNSPECIFIED TRIMESTER: ICD-10-CM

## 2021-01-09 DIAGNOSIS — O26.899 OTHER SPECIFIED PREGNANCY RELATED CONDITIONS, UNSPECIFIED TRIMESTER: ICD-10-CM

## 2021-01-09 DIAGNOSIS — Z3A.00 WEEKS OF GESTATION OF PREGNANCY NOT SPECIFIED: ICD-10-CM

## 2021-01-09 LAB
BASOPHILS # BLD AUTO: 0.02 K/UL — SIGNIFICANT CHANGE UP (ref 0–0.2)
BASOPHILS NFR BLD AUTO: 0.2 % — SIGNIFICANT CHANGE UP (ref 0–2)
BLD GP AB SCN SERPL QL: NEGATIVE — SIGNIFICANT CHANGE UP
EOSINOPHIL # BLD AUTO: 0.14 K/UL — SIGNIFICANT CHANGE UP (ref 0–0.5)
EOSINOPHIL NFR BLD AUTO: 1.6 % — SIGNIFICANT CHANGE UP (ref 0–6)
HCT VFR BLD CALC: 38.1 % — SIGNIFICANT CHANGE UP (ref 34.5–45)
HGB BLD-MCNC: 12.7 G/DL — SIGNIFICANT CHANGE UP (ref 11.5–15.5)
IMM GRANULOCYTES NFR BLD AUTO: 0.6 % — SIGNIFICANT CHANGE UP (ref 0–1.5)
LYMPHOCYTES # BLD AUTO: 1.57 K/UL — SIGNIFICANT CHANGE UP (ref 1–3.3)
LYMPHOCYTES # BLD AUTO: 17.9 % — SIGNIFICANT CHANGE UP (ref 13–44)
MCHC RBC-ENTMCNC: 28.5 PG — SIGNIFICANT CHANGE UP (ref 27–34)
MCHC RBC-ENTMCNC: 33.3 GM/DL — SIGNIFICANT CHANGE UP (ref 32–36)
MCV RBC AUTO: 85.4 FL — SIGNIFICANT CHANGE UP (ref 80–100)
MONOCYTES # BLD AUTO: 0.67 K/UL — SIGNIFICANT CHANGE UP (ref 0–0.9)
MONOCYTES NFR BLD AUTO: 7.7 % — SIGNIFICANT CHANGE UP (ref 2–14)
NEUTROPHILS # BLD AUTO: 6.3 K/UL — SIGNIFICANT CHANGE UP (ref 1.8–7.4)
NEUTROPHILS NFR BLD AUTO: 72 % — SIGNIFICANT CHANGE UP (ref 43–77)
NRBC # BLD: 0 /100 WBCS — SIGNIFICANT CHANGE UP (ref 0–0)
PLATELET # BLD AUTO: 180 K/UL — SIGNIFICANT CHANGE UP (ref 150–400)
RBC # BLD: 4.46 M/UL — SIGNIFICANT CHANGE UP (ref 3.8–5.2)
RBC # FLD: 12.8 % — SIGNIFICANT CHANGE UP (ref 10.3–14.5)
RH IG SCN BLD-IMP: POSITIVE — SIGNIFICANT CHANGE UP
SARS-COV-2 RNA SPEC QL NAA+PROBE: DETECTED
WBC # BLD: 8.75 K/UL — SIGNIFICANT CHANGE UP (ref 3.8–10.5)
WBC # FLD AUTO: 8.75 K/UL — SIGNIFICANT CHANGE UP (ref 3.8–10.5)

## 2021-01-09 RX ORDER — OXYTOCIN 10 UNIT/ML
4 VIAL (ML) INJECTION
Qty: 30 | Refills: 0 | Status: DISCONTINUED | OUTPATIENT
Start: 2021-01-09 | End: 2021-01-10

## 2021-01-09 RX ORDER — OXYTOCIN 10 UNIT/ML
333.33 VIAL (ML) INJECTION
Qty: 20 | Refills: 0 | Status: DISCONTINUED | OUTPATIENT
Start: 2021-01-09 | End: 2021-01-10

## 2021-01-09 RX ORDER — CITRIC ACID/SODIUM CITRATE 300-500 MG
15 SOLUTION, ORAL ORAL EVERY 6 HOURS
Refills: 0 | Status: DISCONTINUED | OUTPATIENT
Start: 2021-01-09 | End: 2021-01-10

## 2021-01-09 RX ORDER — AMPICILLIN TRIHYDRATE 250 MG
1 CAPSULE ORAL EVERY 4 HOURS
Refills: 0 | Status: DISCONTINUED | OUTPATIENT
Start: 2021-01-09 | End: 2021-01-10

## 2021-01-09 RX ORDER — SODIUM CHLORIDE 9 MG/ML
1000 INJECTION, SOLUTION INTRAVENOUS
Refills: 0 | Status: DISCONTINUED | OUTPATIENT
Start: 2021-01-09 | End: 2021-01-10

## 2021-01-09 RX ORDER — AMPICILLIN TRIHYDRATE 250 MG
2 CAPSULE ORAL ONCE
Refills: 0 | Status: COMPLETED | OUTPATIENT
Start: 2021-01-09 | End: 2021-01-09

## 2021-01-09 RX ADMIN — SODIUM CHLORIDE 125 MILLILITER(S): 9 INJECTION, SOLUTION INTRAVENOUS at 20:59

## 2021-01-09 RX ADMIN — Medication 4 MILLIUNIT(S)/MIN: at 20:25

## 2021-01-09 RX ADMIN — Medication 216 GRAM(S): at 20:02

## 2021-01-09 NOTE — OB PROVIDER IHI INDUCTION/AUGMENTATION NOTE - NS_CHECKALL_OBGYN_ALL_OB
Order was written/Contractions pattern was reviewed/FHR was reviewed/Induction / Augmentation was discussed

## 2021-01-09 NOTE — OB PROVIDER H&P - HISTORY OF PRESENT ILLNESS
R1 H&P    Pt is a 31 y/o  at 39w1d coming in with contractions that started this morning at 1am. Q30 minutes lasting for 1-2 minutes. Patient notes that they have spaced out and she is feeling comfortable now. No vaginal bleeding or LOF. Positive fetal movement. Patient would like to stay for induction.  Prenatal course uncomplicated  GBS positive  EFW 3500    OBHx:  G1 2007 eTOP D&C  G2 2016 SAB  G3 2016 FT  5#8  GynHx: h/o HPV+ with normal paps; denies STI's, fibroids, cysts  PMHx: denies  PSHx: D&C   Med: denies  All: NKDA  SH: denies alcohol, tobacco, or drug use  Psych: denies h/o anxiety or depression      EFH: 145, mod araseli, +accels, -decels  [place]: quiet  VE: 3/70/-3  TAUS: vertex    A&P: 31 y/o  at 39w1d for eIOL. GBS positive. EFW 3500.  Labor: admit to L&D  -pitocin 4x4  -amp for gbs ppx  -routine labs  -EFM/toco  -NPO, IV hydration  Fetal: cat 1 tracing, fetal status reassuring      Discussed with Dr. Reji Shelton PGY1 R1 H&P    Pt is a 29 y/o  at 39w1d coming in with contractions that started this morning at 1am. Q30 minutes lasting for 1-2 minutes. Patient notes that they have spaced out and she is feeling comfortable now. No vaginal bleeding or LOF. Positive fetal movement. Patient would like to stay for induction.  Of note, patient's partner was COVID+ on  (asymptomatic). Patient is asymptomatic at this time and was tested on Wednesday and does not know results.  Prenatal course uncomplicated  GBS positive  EFW 3500    OBHx:  G1 2007 eTOP D&C  G2 2016 SAB  G3 2016 FT  5#8  GynHx: h/o HPV+ with normal paps; denies STI's, fibroids, cysts  PMHx: denies  PSHx: D&C   Med: denies  All: NKDA  SH: denies alcohol, tobacco, or drug use  Psych: denies h/o anxiety or depression      EFH: 145, mod araseli, +accels, -decels  [place]: quiet  VE: 3/70/-3  TAUS: vertex    A&P: 29 y/o  at 39w1d for eIOL. GBS positive. EFW 3500.  Labor: admit to L&D  -pitocin 4x4  -amp for gbs ppx  -routine labs  -EFM/toco  -NPO, IV hydration  Fetal: cat 1 tracing, fetal status reassuring      Discussed with Dr. Reji Shelton PGY1

## 2021-01-09 NOTE — OB PROVIDER LABOR PROGRESS NOTE - ASSESSMENT
A/P:   Ms Tate is a healthy multipara, w/ IUP at 39+ wks. She presented earlier today w/ irreg ctxns.  She was not found to be in active labor, however she decided to be admitted for labor induction after discussion or r/b's.  Induction was offered due to favorable cervical exam, gest age > 39 wks, +GBS and need for intrapartum antibiotic prophylaxis.    Currently, maternal and fetal conditions are stable.  she's now in active phase of 1st stage of labor.   will watch FHR changes closely.    once 4 hours after receiving IV abx, will consider AROM

## 2021-01-10 ENCOUNTER — TRANSCRIPTION ENCOUNTER (OUTPATIENT)
Age: 31
End: 2021-01-10

## 2021-01-10 PROBLEM — F41.9 ANXIETY DISORDER, UNSPECIFIED: Chronic | Status: ACTIVE | Noted: 2020-12-26

## 2021-01-10 LAB
SARS-COV-2 IGG SERPL QL IA: NEGATIVE — SIGNIFICANT CHANGE UP
SARS-COV-2 IGM SERPL IA-ACNC: 0.07 INDEX — SIGNIFICANT CHANGE UP
T PALLIDUM AB TITR SER: NEGATIVE — SIGNIFICANT CHANGE UP

## 2021-01-10 RX ORDER — IBUPROFEN 200 MG
600 TABLET ORAL EVERY 6 HOURS
Refills: 0 | Status: DISCONTINUED | OUTPATIENT
Start: 2021-01-10 | End: 2021-01-11

## 2021-01-10 RX ORDER — BENZOCAINE 10 %
1 GEL (GRAM) MUCOUS MEMBRANE EVERY 6 HOURS
Refills: 0 | Status: DISCONTINUED | OUTPATIENT
Start: 2021-01-10 | End: 2021-01-11

## 2021-01-10 RX ORDER — AER TRAVELER 0.5 G/1
1 SOLUTION RECTAL; TOPICAL EVERY 4 HOURS
Refills: 0 | Status: DISCONTINUED | OUTPATIENT
Start: 2021-01-10 | End: 2021-01-11

## 2021-01-10 RX ORDER — GENTAMICIN SULFATE 40 MG/ML
280 VIAL (ML) INJECTION DAILY
Refills: 0 | Status: DISCONTINUED | OUTPATIENT
Start: 2021-01-10 | End: 2021-01-10

## 2021-01-10 RX ORDER — DOCUSATE SODIUM 100 MG
1 CAPSULE ORAL
Qty: 0 | Refills: 0 | DISCHARGE

## 2021-01-10 RX ORDER — SODIUM CHLORIDE 9 MG/ML
3 INJECTION INTRAMUSCULAR; INTRAVENOUS; SUBCUTANEOUS EVERY 8 HOURS
Refills: 0 | Status: DISCONTINUED | OUTPATIENT
Start: 2021-01-10 | End: 2021-01-11

## 2021-01-10 RX ORDER — MAGNESIUM HYDROXIDE 400 MG/1
30 TABLET, CHEWABLE ORAL
Refills: 0 | Status: DISCONTINUED | OUTPATIENT
Start: 2021-01-10 | End: 2021-01-11

## 2021-01-10 RX ORDER — LANOLIN
1 OINTMENT (GRAM) TOPICAL
Qty: 0 | Refills: 0 | DISCHARGE
Start: 2021-01-10

## 2021-01-10 RX ORDER — IBUPROFEN 200 MG
3 TABLET ORAL
Qty: 0 | Refills: 0 | DISCHARGE

## 2021-01-10 RX ORDER — DIPHENHYDRAMINE HCL 50 MG
25 CAPSULE ORAL EVERY 6 HOURS
Refills: 0 | Status: DISCONTINUED | OUTPATIENT
Start: 2021-01-10 | End: 2021-01-11

## 2021-01-10 RX ORDER — OXYTOCIN 10 UNIT/ML
333.33 VIAL (ML) INJECTION
Qty: 20 | Refills: 0 | Status: DISCONTINUED | OUTPATIENT
Start: 2021-01-10 | End: 2021-01-11

## 2021-01-10 RX ORDER — ACETAMINOPHEN 500 MG
2 TABLET ORAL
Qty: 0 | Refills: 0 | DISCHARGE

## 2021-01-10 RX ORDER — ACETAMINOPHEN 500 MG
1000 TABLET ORAL ONCE
Refills: 0 | Status: COMPLETED | OUTPATIENT
Start: 2021-01-10 | End: 2021-01-10

## 2021-01-10 RX ORDER — OXYCODONE HYDROCHLORIDE 5 MG/1
5 TABLET ORAL ONCE
Refills: 0 | Status: DISCONTINUED | OUTPATIENT
Start: 2021-01-10 | End: 2021-01-11

## 2021-01-10 RX ORDER — AMPICILLIN TRIHYDRATE 250 MG
CAPSULE ORAL
Refills: 0 | Status: DISCONTINUED | OUTPATIENT
Start: 2021-01-10 | End: 2021-01-10

## 2021-01-10 RX ORDER — OXYCODONE HYDROCHLORIDE 5 MG/1
5 TABLET ORAL
Refills: 0 | Status: DISCONTINUED | OUTPATIENT
Start: 2021-01-10 | End: 2021-01-11

## 2021-01-10 RX ORDER — AMPICILLIN TRIHYDRATE 250 MG
2 CAPSULE ORAL ONCE
Refills: 0 | Status: COMPLETED | OUTPATIENT
Start: 2021-01-10 | End: 2021-01-10

## 2021-01-10 RX ORDER — DIBUCAINE 1 %
1 OINTMENT (GRAM) RECTAL EVERY 6 HOURS
Refills: 0 | Status: DISCONTINUED | OUTPATIENT
Start: 2021-01-10 | End: 2021-01-11

## 2021-01-10 RX ORDER — AMPICILLIN TRIHYDRATE 250 MG
2 CAPSULE ORAL EVERY 6 HOURS
Refills: 0 | Status: DISCONTINUED | OUTPATIENT
Start: 2021-01-10 | End: 2021-01-10

## 2021-01-10 RX ORDER — PRAMOXINE HYDROCHLORIDE 150 MG/15G
1 AEROSOL, FOAM RECTAL EVERY 4 HOURS
Refills: 0 | Status: DISCONTINUED | OUTPATIENT
Start: 2021-01-10 | End: 2021-01-11

## 2021-01-10 RX ORDER — HYDROCORTISONE 1 %
1 OINTMENT (GRAM) TOPICAL EVERY 6 HOURS
Refills: 0 | Status: DISCONTINUED | OUTPATIENT
Start: 2021-01-10 | End: 2021-01-11

## 2021-01-10 RX ORDER — LANOLIN
1 OINTMENT (GRAM) TOPICAL EVERY 6 HOURS
Refills: 0 | Status: DISCONTINUED | OUTPATIENT
Start: 2021-01-10 | End: 2021-01-11

## 2021-01-10 RX ORDER — TETANUS TOXOID, REDUCED DIPHTHERIA TOXOID AND ACELLULAR PERTUSSIS VACCINE, ADSORBED 5; 2.5; 8; 8; 2.5 [IU]/.5ML; [IU]/.5ML; UG/.5ML; UG/.5ML; UG/.5ML
0.5 SUSPENSION INTRAMUSCULAR ONCE
Refills: 0 | Status: DISCONTINUED | OUTPATIENT
Start: 2021-01-10 | End: 2021-01-11

## 2021-01-10 RX ORDER — FERROUS SULFATE 325(65) MG
1 TABLET ORAL
Qty: 0 | Refills: 0 | DISCHARGE

## 2021-01-10 RX ORDER — SIMETHICONE 80 MG/1
1 TABLET, CHEWABLE ORAL
Qty: 0 | Refills: 0 | DISCHARGE
Start: 2021-01-10

## 2021-01-10 RX ORDER — SIMETHICONE 80 MG/1
80 TABLET, CHEWABLE ORAL EVERY 4 HOURS
Refills: 0 | Status: DISCONTINUED | OUTPATIENT
Start: 2021-01-10 | End: 2021-01-11

## 2021-01-10 RX ORDER — ACETAMINOPHEN 500 MG
975 TABLET ORAL
Refills: 0 | Status: DISCONTINUED | OUTPATIENT
Start: 2021-01-10 | End: 2021-01-11

## 2021-01-10 RX ORDER — KETOROLAC TROMETHAMINE 30 MG/ML
30 SYRINGE (ML) INJECTION ONCE
Refills: 0 | Status: DISCONTINUED | OUTPATIENT
Start: 2021-01-10 | End: 2021-01-10

## 2021-01-10 RX ORDER — IBUPROFEN 200 MG
600 TABLET ORAL EVERY 6 HOURS
Refills: 0 | Status: COMPLETED | OUTPATIENT
Start: 2021-01-10 | End: 2021-12-09

## 2021-01-10 RX ADMIN — Medication 975 MILLIGRAM(S): at 21:25

## 2021-01-10 RX ADMIN — Medication 216 GRAM(S): at 01:17

## 2021-01-10 RX ADMIN — Medication 975 MILLIGRAM(S): at 09:38

## 2021-01-10 RX ADMIN — Medication 600 MILLIGRAM(S): at 18:15

## 2021-01-10 RX ADMIN — Medication 216 GRAM(S): at 15:23

## 2021-01-10 RX ADMIN — Medication 1 TABLET(S): at 15:30

## 2021-01-10 RX ADMIN — Medication 216 GRAM(S): at 08:05

## 2021-01-10 RX ADMIN — OXYCODONE HYDROCHLORIDE 5 MILLIGRAM(S): 5 TABLET ORAL at 18:21

## 2021-01-10 RX ADMIN — Medication 500 MILLIGRAM(S): at 03:42

## 2021-01-10 RX ADMIN — Medication 108 GRAM(S): at 00:07

## 2021-01-10 RX ADMIN — MAGNESIUM HYDROXIDE 30 MILLILITER(S): 400 TABLET, CHEWABLE ORAL at 18:21

## 2021-01-10 RX ADMIN — Medication 30 MILLIGRAM(S): at 04:18

## 2021-01-10 RX ADMIN — Medication 975 MILLIGRAM(S): at 15:24

## 2021-01-10 RX ADMIN — Medication 400 MILLIGRAM(S): at 01:17

## 2021-01-10 RX ADMIN — Medication 1000 MILLIUNIT(S)/MIN: at 05:50

## 2021-01-10 NOTE — DISCHARGE NOTE OB - MEDICATION SUMMARY - MEDICATIONS TO TAKE
I will START or STAY ON the medications listed below when I get home from the hospital:    ibuprofen 200 mg oral capsule  -- up to 3 cap(s) by mouth every 6 hours, As Needed  -- Indication: For pain, cramps or fever    acetaminophen 500 mg oral tablet  -- 2 tab(s) by mouth every 6 hours, As Needed  -- Indication: For pain, cramps or fever    lanolin topical ointment  -- 1 application on skin every 6 hours, As needed, nipple soreness  -- Indication: For cracked or sore nipples    Prenatal Multivitamins with Folic Acid 1 mg oral tablet  -- 1 tab(s) by mouth once a day  -- Indication: For vitamins    Slow Fe (as elemental iron) 45 mg oral tablet, extended release  -- 1 tab(s) by mouth once a day  -- Indication: For iron supplement    Colace 100 mg oral capsule  -- 1 cap(s) by mouth 2 times a day, As Needed  -- Indication: For constipation    simethicone 80 mg oral tablet, chewable  -- 1 tab(s) by mouth every 4 hours, As needed, Gas  -- Indication: For gas pain

## 2021-01-10 NOTE — OB PROVIDER LABOR PROGRESS NOTE - ASSESSMENT
29 y/o  at 39w2d in early labor on pitocin. AROM with clear fluid.  - c/w pitocin    d/w Dr. Reji Shelton PGY1

## 2021-01-10 NOTE — DISCHARGE NOTE OB - MATERIALS PROVIDED
[place] Feeding Log/Guide to Postpartum Care/[facility] Hearing Screen Program/Breastfeeding Guide and Packet/Birth Certificate Instructions/Discharge Medication Information for Patients and Families Pocket Guide

## 2021-01-10 NOTE — DISCHARGE NOTE OB - CARE PROVIDER_API CALL
Troy Mendoza)  Obstetrics and Gynecology  [address], [address]  [place] [address]  Phone: [phone]  Fax: [phone]  Follow Up Time:

## 2021-01-10 NOTE — OB PROVIDER LABOR PROGRESS NOTE - NS_OBIHIFHRDETAILS_OBGYN_ALL_OB_FT
130s, mod araseli, +accels, -decels
stable baseline.  + mod variability.  occasional mild variable decels.  category 2 FHR tracing

## 2021-01-10 NOTE — DISCHARGE NOTE OB - HOSPITAL COURSE
Patient delivered vaginally on 1/10/21 without any complications.  Her postpartum course was uneventful, and she reached appropriate postoperative milestones.  There was no excessive or abnormal vaginal bleeding.  Her uterine tone was normal.   There was no sing or symptom of infections.   Discharge and follow up instructions discussed with patient.

## 2021-01-10 NOTE — OB RN DELIVERY SUMMARY - NS_LABORCHARACTER_OBGYN_ALL_OB
Induction of labor-AROM/Induction of labor-Medicinal/Augmentation of labor/Febrile (>38C)/External electronic FM/Antibiotics in labor/Chorioamnionitis

## 2021-01-10 NOTE — DISCHARGE NOTE OB - CARE PLAN
Principal Discharge DX:	Vaginal delivery  Goal:	recovery  Assessment and plan of treatment:	Patient is stable and doing well upon discharge from the hospital.  She has been counseled regarding postpartum care, modification of her activities and pain management.   She will be seen at outpatient ob/gyn office for postpartum check in about 4 - 6 weeks (or sooner if needed.)

## 2021-01-10 NOTE — OB RN DELIVERY SUMMARY - NS_SEPSISRSKCALC_OBGYN_ALL_OB_FT
EOS calculated successfully. EOS Risk Factor: 0.5/1000 live births ([facility] national incidence); GA=39w2d; Temp=100.94; ROM=3; GBS='Positive'; Antibiotics='Broad spectrum antibiotics 2-3.9 hrs prior to birth'

## 2021-01-10 NOTE — OB PROVIDER DELIVERY SUMMARY - NSPROVIDERDELIVERYNOTE_OBGYN_ALL_OB_FT
Spontaneous vaginal delivery of liveborn infant from OP position. Head, shoulders and body delivered without complication. Infant was suctioned, heavy mec present. Cord was clamped and cut and infant was passed to mother/pediatrician. Placenta was delivered intact with a 3 vessel cord. Fundal massage was given and uterus was found to be firm. Vaginal exam revealed an intact cervix, vaginal walls and sulci. Patient had a first degree laceration in the perineum which was repaired with 2.0 vicryl suture. Excellent hemostasis was noted. Count was correct x2.    Margret Shelton PGY1 Spontaneous vaginal delivery of liveborn infant from OP position. Head, shoulders and body delivered without complication. Infant was suctioned, heavy mec present. Cord was clamped and cut and infant was passed to mother/pediatrician. Placenta was delivered intact with a 3 vessel cord. Fundal massage was given and uterus was found to be firm. Vaginal exam revealed an intact cervix, vaginal walls and sulci. Patient had a first degree laceration in the perineum which was repaired with 2.0 vicryl suture. Excellent hemostasis was noted. Count was correct x2.    Margret Shelton PGY1    Ob Attg Note  I agree w/ above note.  Uncomplicated . small 1st degree perin lac repaired.  Normal blood loss.

## 2021-01-10 NOTE — OB PROVIDER LABOR PROGRESS NOTE - NS_SUBJECTIVE/OBJECTIVE_OBGYN_ALL_OB_FT
Evaluated patient for cervical change. AROM with clear fluid.
Pt became very uncomfortable w/ her uterine contractions.  Epidural anesth administered just recently.  She's now starting to get relief.

## 2021-01-10 NOTE — DISCHARGE NOTE OB - PATIENT PORTAL LINK FT
You can access the FollowMyHealth Patient Portal offered by [facility] by registering at the following website: [email or web address]. By joining [facility]’s FollowMyHealth portal, you will also be able to view your health information using other applications (apps) compatible with our system.

## 2021-01-10 NOTE — DISCHARGE NOTE OB - MEDICATION SUMMARY - MEDICATIONS TO STOP TAKING
I will STOP taking the medications listed below when I get home from the hospital:    dicloxacillin  --  by mouth    Keflex 500 mg oral capsule  -- 1 cap(s) by mouth every 8 hours   -- Finish all this medication unless otherwise directed by prescriber.

## 2021-01-10 NOTE — DISCHARGE NOTE OB - COMMENTS
(T22.184) Keratoconjunct sicca, not specified as Sjogren's, bilateral - Assesment : Examination revealed Dry Eye Syndrome OU. Stable. - Plan : Recommend to continue restasis BID OU and PF Artificial tears 2-3 times daily OU to help wash out allergens. May consider punctal plugs if dryness continues bothersome. Monitor for changes. Advised patient to call our office with decreased vision or increased symptoms.  RV 6 months Exam. follow up with OB appt

## 2021-01-11 VITALS
DIASTOLIC BLOOD PRESSURE: 62 MMHG | RESPIRATION RATE: 18 BRPM | SYSTOLIC BLOOD PRESSURE: 96 MMHG | HEART RATE: 63 BPM | TEMPERATURE: 98 F

## 2021-01-11 PROCEDURE — 59025 FETAL NON-STRESS TEST: CPT

## 2021-01-11 PROCEDURE — 86900 BLOOD TYPING SEROLOGIC ABO: CPT

## 2021-01-11 PROCEDURE — 86769 SARS-COV-2 COVID-19 ANTIBODY: CPT

## 2021-01-11 PROCEDURE — 59050 FETAL MONITOR W/REPORT: CPT

## 2021-01-11 PROCEDURE — 86780 TREPONEMA PALLIDUM: CPT

## 2021-01-11 PROCEDURE — 85025 COMPLETE CBC W/AUTO DIFF WBC: CPT

## 2021-01-11 PROCEDURE — 87635 SARS-COV-2 COVID-19 AMP PRB: CPT

## 2021-01-11 PROCEDURE — 86901 BLOOD TYPING SEROLOGIC RH(D): CPT

## 2021-01-11 PROCEDURE — G0463: CPT

## 2021-01-11 PROCEDURE — 86850 RBC ANTIBODY SCREEN: CPT

## 2021-01-11 PROCEDURE — U0005: CPT

## 2021-01-11 RX ADMIN — Medication 600 MILLIGRAM(S): at 06:02

## 2021-01-11 RX ADMIN — Medication 975 MILLIGRAM(S): at 03:40

## 2021-01-11 RX ADMIN — Medication 600 MILLIGRAM(S): at 12:05

## 2021-01-11 RX ADMIN — Medication 1 TABLET(S): at 12:05

## 2021-01-11 RX ADMIN — Medication 975 MILLIGRAM(S): at 09:13

## 2021-01-11 RX ADMIN — Medication 975 MILLIGRAM(S): at 15:07

## 2021-01-11 RX ADMIN — Medication 600 MILLIGRAM(S): at 00:29

## 2021-01-11 NOTE — PROGRESS NOTE ADULT - ASSESSMENT
A/P: 31yo COVID+ PPD#1 s/p  c/b chorioamnionitis s/p A/G/T.  Patient is stable, afebrile, and doing well post-partum.

## 2021-01-11 NOTE — PROGRESS NOTE ADULT - PROBLEM SELECTOR PLAN 1
- Afebrile since 1am on 1/10/20, continue to trend fever curve  - COVID+, but asymptomatic, continue to monitor   - Pain well controlled, continue current pain regimen  - Increase ambulation, SCDs when not ambulating  - Continue regular diet    Iliana Cortez MD PGY1

## 2021-01-11 NOTE — PROGRESS NOTE ADULT - ATTENDING COMMENTS
Patient seen and examined, agree with above note. Pt doing well. Ambulating well. Tolerating PO. Voiding well and passing flatus. Lochia appropriate. No pain. Bottle feeding. No fevers/chills, SoB, palpitations.     Gen: AAOx3, NAD  Abd: Soft, NT, fundus firm at U   Ext: NT     Intrapartum course complicated by chorioamnionitis, s/p abx therapy. Afebrile since 1/10 1 am. Discharge home with follow up in office in 5 weeks. Advised to continue with quarantine at home 2/2 covid +.

## 2021-01-14 ENCOUNTER — EMERGENCY (EMERGENCY)
Facility: HOSPITAL | Age: 31
LOS: 0 days | Discharge: HOME | End: 2021-01-14
Attending: EMERGENCY MEDICINE
Payer: COMMERCIAL

## 2021-01-14 VITALS
RESPIRATION RATE: 18 BRPM | SYSTOLIC BLOOD PRESSURE: 138 MMHG | OXYGEN SATURATION: 97 % | DIASTOLIC BLOOD PRESSURE: 88 MMHG | HEART RATE: 70 BPM

## 2021-01-14 VITALS — WEIGHT: 139.99 LBS | HEIGHT: 61 IN

## 2021-01-14 DIAGNOSIS — F41.9 ANXIETY DISORDER, UNSPECIFIED: ICD-10-CM

## 2021-01-14 DIAGNOSIS — U07.1 COVID-19: ICD-10-CM

## 2021-01-14 PROCEDURE — 99284 EMERGENCY DEPT VISIT MOD MDM: CPT

## 2021-01-14 PROCEDURE — 99283 EMERGENCY DEPT VISIT LOW MDM: CPT

## 2021-01-14 NOTE — ED STATDOCS - CARE PROVIDER_API CALL
Troy Mendoza)  Obstetrics and Gynecology  [address], [address]  [place] [address]  Phone: [phone]  Fax: [phone]  Follow Up Time:

## 2021-01-14 NOTE — ED STATDOCS - PROGRESS NOTE DETAILS
31 y/o F with PMHx of anxiety presents to the ED sent by OBGYN for eval of perineal wound s/p vaginal delivery on 1/10 with Dr. Mendoza at [facility]. Has 1st degree tear.    Reports stitches came out on 1/12 at home. No fever. Called OB told to come for evaluation. NKELI.  Milagro Garcia PA-C I examined patient in room with .  No active bleeding from episiotomy site with proximal perineal wound open.  Will consult GYN.  Milagro Garcia PA-C GYN evaluated patient.  No need for ABX and resuturing.  Follow with Routine outpatient  OB care.  Milagro Garcia PA-C

## 2021-01-14 NOTE — ED STATDOCS - NSFOLLOWUPINSTRUCTIONS_ED_ALL_ED_FT
Wound Dehiscence    WHAT YOU NEED TO KNOW:    Wound dehiscence is when part or all of a wound comes apart. You may need medicine, wound care, surgery, or wound devices to help treat your wound. Wound dehiscence can become life-threatening.     DISCHARGE INSTRUCTIONS:    Seek care immediately if:   •Your heart is beating faster than usual, or you feel dizzy or lightheaded.      •Blood soaks through your bandage.      •You see tissue coming through your wound.       •You feel like your wound is opening up more.       •Your wound oozes yellow or green pus, looks swollen or red, or feels warm.      Contact your healthcare provider or surgeon if:   •You have a fever or chills.      •Your wound leaks fluid or a small amount of blood.      •Your pain gets worse or does not get better after you take pain medicine.      •You have nausea or are vomiting.       •You have questions or concerns about your condition or care.      Medicines:   •Antibiotics help treat a bacterial infection.       •Prescription pain medicine may be given. Ask your healthcare provider how to take this medicine safely. Some prescription pain medicines contain acetaminophen. Do not take other medicines that contain acetaminophen without talking to your healthcare provider. Too much acetaminophen may cause liver damage. Prescription pain medicine may cause constipation. Ask your healthcare provider how to prevent or treat constipation.       •Take your medicine as directed. Contact your healthcare provider if you think your medicine is not helping or if you have side effects. Tell him or her if you are allergic to any medicine. Keep a list of the medicines, vitamins, and herbs you take. Include the amounts, and when and why you take them. Bring the list or the pill bottles to follow-up visits. Carry your medicine list with you in case of an emergency.      Wound care:   •Wash your hands often. Use soap and water. Wash your hands before and after you touch your wound. This will help to prevent an infection.       •Clean your wound as directed. Ask your healthcare provider if it okay to shower or take a bath. Let the soap and water run over your wound. Gently pat the area dry. Look for signs of infection, such as redness, swelling, or pus.       •Change your bandages as directed. Replace bandages after you clean the wound or bathe. Change your bandages when they get wet or dirty. If directed, pack your wound. Change the packing as directed.       •Do not swim or go in hot tubs until your healthcare provider says it is okay. Hot tubs and pools can cause infection and prevent wound healing.       •Wear your binder or splint at all times or as directed. These devices help hold your wound together.       •Use devices as directed to help the wound heal. Your healthcare provider will show you how to care for your wound device.       Self-care:   •Rest as directed. Do not lift anything heavier than 5 pounds. Do not do activities that may put stress on your wound, such as running or sports. Ask your healthcare provider when you can return to your usual activities.       •Eat foods high in protein. Protein will help your wound heal. Protein can be found in lean meat, fish, beans, and low-fat dairy. Your healthcare provider may also recommend certain drinks for added protein.      •Do not smoke. Nicotine and other chemicals in cigarettes and cigars can prevent your wound from healing. Ask your healthcare provider for information if you currently smoke and need help to quit. E-cigarettes or smokeless tobacco still contain nicotine. Talk to your healthcare provider before you use these products.       Follow up with your healthcare provider or surgeon as directed: You will need to return to have your wound checked. Write down your questions so you remember to ask them during your visits.

## 2021-01-14 NOTE — ED STATDOCS - PHYSICAL EXAMINATION
Gen: Well appearing in NAD  Head: NC/AT  Neck: trachea midline  Resp:  No distress  Ext: no deformities  Neuro:  A&O appears non focal  Skin:  Warm and dry as visualized  Psych:  Normal affect and mood  : Exam defered to OBGYN

## 2021-01-14 NOTE — ED ADULT NURSE NOTE - OBJECTIVE STATEMENT
Patient gave birth on January 10th and had a 1st degree tear with suturing.  Wound has not healed properly as per patient.  Patient has also had fevers. Bilateral breast pain.  Not breastfeeding.  Breasts full.

## 2021-01-14 NOTE — ED STATDOCS - CLINICAL SUMMARY MEDICAL DECISION MAKING FREE TEXT BOX
Perineal tear with possible dehiscence. Will consult OBGYN. Perineal tear with possible dehiscence. Will consult OBGYN.      GYN evaluated patient.  No need for ABX and resuturing.  Follow with Routine outpatient  OB care.  Milagro Garcia PA-C

## 2021-01-14 NOTE — ED STATDOCS - PATIENT PORTAL LINK FT
You can access the FollowMyHealth Patient Portal offered by [facility] by registering at the following website: [email or web address]. By joining [employer]’s FollowMyHealth portal, you will also be able to view your health information using other applications (apps) compatible with our system.

## 2021-01-14 NOTE — ED STATDOCS - OBJECTIVE STATEMENT
31 y/o F with PMHx of anxiety presents to the ED sent by OBGYN for eval of perineal wound s/p vaginal delivery on 1/10 with Dr. Mendoza at [facility]. Has 1st degree tear. Reports stitches came out on 1/12 at home. No fever. Called OB told to come for evaluation. NKDA.

## 2021-01-14 NOTE — CONSULT NOTE ADULT - SUBJECTIVE AND OBJECTIVE BOX
29 yo /p  2021 at [facility] presenting with recurring pelvic pain, fever and vaginal bleeding that started on Tuesday. Patient had a 1st degree laceration during delivery and states she felt the stitch fell off. She has been bleeding, passing large clots and soiling up to 6 pads per day. Bleeding has lightened since Tuesday (3-4 pads). Patient has been taking Tyelenol and Motrin every 4-6 hours everyday since Tuesday due to recurring fever and pain. Tmax 101.3 at home.   Of note, patient had chorioamnionitis and received ampicillin/gentamicin intrapartum with an uncomplicated vaginal delivery.   Patient admits to -8/10 pelvic cramping and nausea, chills.   Denies syncope, cp, palpitations, sob, abdominal pain, dysuria.     PMHx: denies  Allergies: denies  OB GYN: uncomplicated , uncomplicated pregnancy, 2 miscarriages ('07, 16). no prior c sections  Surgeries: denies  Social: denies alcohol, smoking or drug use  denies sexual activity ~ 6 weeks   FMHx: fibroids in mother   Meds: tyelenol and motrin for pain and fever     VS: 145/97 T 98.4 F HR 74   Physical Exam:  General: in NAD   Cardio: RRR, +S1/S2  Pulmonary: CTA b/l no RRW   GI: soft, mild tenderness to deep palpation lower abdomen, nondistended, BSX4  : normal lochia, 1st degree vaginal laceration with granulation tissue at 8oclock, no erythema or pus, no fluctuance, stiches not seen     Assessment:  29 yo  s/p  X 5 days likely physiologic post partum vaginal bleeding and pelvic pain. Low grade fever likely from mastitis.     Plan:  - GYN exam showed no abnormalities  - Patient to continue routine post partum care.   - stable for discharge home for routine follow up with OBGYN Dr. Gutierres

## 2021-01-14 NOTE — ED ADULT NURSE NOTE - ISOLATION PROVIDED EDUCATION
Health Maintenance Summary     Topic Due On Due Status Completed On Postpone Until Reason    Pap Smear - Cervical Cancer Screening  Feb 10, 2017 Overdue       Immunization - TDAP Pregnancy  Hidden       IMMUNIZATION - DTaP/Tdap/Td Apr 2, 2023 Not Due Apr 2, 2013      Immunization-Influenza Sep 1, 2017 Postponed  Apr 1, 2018 Patient Refused    Depression Screening Feb 10, 1999 Overdue             Patient is due for topics as listed above, she wishes to proceed at this time, order (s) placed and patient given information .           Patient

## 2021-01-14 NOTE — ED ADULT TRIAGE NOTE - CHIEF COMPLAINT QUOTE
pt a/ox3, c/o "I just gave birth on 1/10/2021 and I was sent by MD Coronel for my stitches opening up". pt denies all other complaints/symptoms at this time.

## 2021-02-15 ENCOUNTER — RESULT REVIEW (OUTPATIENT)
Age: 31
End: 2021-02-15

## 2021-04-10 ENCOUNTER — TRANSCRIPTION ENCOUNTER (OUTPATIENT)
Age: 31
End: 2021-04-10

## 2021-05-10 ENCOUNTER — TRANSCRIPTION ENCOUNTER (OUTPATIENT)
Age: 31
End: 2021-05-10

## 2021-07-07 NOTE — ED CDU PROVIDER DISPOSITION NOTE - NSDCPRINTRESULTS_ED_ALL_ED
Brea Javier, KATELYNN, CDN  pager [phone]/yes
Patient requests all Lab and Radiology Results on their Discharge Instructions

## 2021-09-17 ENCOUNTER — RESULT REVIEW (OUTPATIENT)
Age: 31
End: 2021-09-17

## 2022-04-21 ENCOUNTER — TRANSCRIPTION ENCOUNTER (OUTPATIENT)
Age: 32
End: 2022-04-21

## 2022-08-04 ENCOUNTER — NON-APPOINTMENT (OUTPATIENT)
Age: 32
End: 2022-08-04

## 2022-10-26 NOTE — ED PROVIDER NOTE - CONSTITUTIONAL MENTATION
[FreeTextEntry1] : She is an asymptomatic  32 year old female referred for  a screening colonoscopy. She has a past history  of colon polyps. Her last colonoscopy was 8/24/2019. She denies a family history of colon cancer. 
oriented to person, place, time/situation/awake/alert

## 2023-01-11 NOTE — ED STATDOCS - SKIN [+], MLM

## 2023-08-09 ENCOUNTER — NON-APPOINTMENT (OUTPATIENT)
Age: 33
End: 2023-08-09
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
## 2024-01-04 ENCOUNTER — NON-APPOINTMENT (OUTPATIENT)
Age: 34
End: 2024-01-04

## 2024-01-21 ENCOUNTER — NON-APPOINTMENT (OUTPATIENT)
Age: 34
End: 2024-01-21

## 2024-02-23 ENCOUNTER — NON-APPOINTMENT (OUTPATIENT)
Age: 34
End: 2024-02-23

## 2024-04-08 ENCOUNTER — RESULT REVIEW (OUTPATIENT)
Age: 34
End: 2024-04-08

## 2024-04-08 ENCOUNTER — OUTPATIENT (OUTPATIENT)
Dept: OUTPATIENT SERVICES | Facility: HOSPITAL | Age: 34
LOS: 1 days | End: 2024-04-08
Payer: COMMERCIAL

## 2024-04-08 ENCOUNTER — APPOINTMENT (OUTPATIENT)
Dept: MRI IMAGING | Facility: HOSPITAL | Age: 34
End: 2024-04-08
Payer: COMMERCIAL

## 2024-04-08 ENCOUNTER — APPOINTMENT (OUTPATIENT)
Dept: INTERNAL MEDICINE | Facility: CLINIC | Age: 34
End: 2024-04-08
Payer: COMMERCIAL

## 2024-04-08 ENCOUNTER — NON-APPOINTMENT (OUTPATIENT)
Age: 34
End: 2024-04-08

## 2024-04-08 VITALS
HEIGHT: 61 IN | BODY MASS INDEX: 23.6 KG/M2 | DIASTOLIC BLOOD PRESSURE: 70 MMHG | WEIGHT: 125 LBS | SYSTOLIC BLOOD PRESSURE: 120 MMHG

## 2024-04-08 DIAGNOSIS — Z00.00 ENCOUNTER FOR GENERAL ADULT MEDICAL EXAMINATION W/OUT ABNORMAL FINDINGS: ICD-10-CM

## 2024-04-08 DIAGNOSIS — Z82.69 FAMILY HISTORY OF OTHER DISEASES OF THE MUSCULOSKELETAL SYSTEM AND CONNECTIVE TISSUE: ICD-10-CM

## 2024-04-08 DIAGNOSIS — G43.909 MIGRAINE, UNSPECIFIED, NOT INTRACTABLE, W/OUT STATUS MIGRAINOSUS: ICD-10-CM

## 2024-04-08 DIAGNOSIS — Z87.39 PERSONAL HISTORY OF OTHER DISEASES OF THE MUSCULOSKELETAL SYSTEM AND CONNECTIVE TISSUE: ICD-10-CM

## 2024-04-08 DIAGNOSIS — S43.429A SPRAIN OF UNSPECIFIED ROTATOR CUFF CAPSULE, INITIAL ENCOUNTER: ICD-10-CM

## 2024-04-08 DIAGNOSIS — R51.9 HEADACHE, UNSPECIFIED: ICD-10-CM

## 2024-04-08 DIAGNOSIS — G43.909 MIGRAINE, UNSPECIFIED, NOT INTRACTABLE, WITHOUT STATUS MIGRAINOSUS: ICD-10-CM

## 2024-04-08 DIAGNOSIS — B00.9 HERPESVIRAL INFECTION, UNSPECIFIED: ICD-10-CM

## 2024-04-08 PROCEDURE — G0444 DEPRESSION SCREEN ANNUAL: CPT | Mod: 59

## 2024-04-08 PROCEDURE — 70551 MRI BRAIN STEM W/O DYE: CPT

## 2024-04-08 PROCEDURE — 99202 OFFICE O/P NEW SF 15 MIN: CPT | Mod: 25

## 2024-04-08 PROCEDURE — 93000 ELECTROCARDIOGRAM COMPLETE: CPT | Mod: 59

## 2024-04-08 PROCEDURE — 70551 MRI BRAIN STEM W/O DYE: CPT | Mod: 26

## 2024-04-08 PROCEDURE — 99385 PREV VISIT NEW AGE 18-39: CPT

## 2024-04-08 RX ORDER — NAPROXEN 500 MG/1
500 TABLET ORAL TWICE DAILY
Qty: 20 | Refills: 0 | Status: ACTIVE | COMMUNITY
Start: 2024-04-08 | End: 1900-01-01

## 2024-04-15 PROBLEM — R51.9 HEADACHE, CHRONIC DAILY: Status: ACTIVE | Noted: 2024-04-08

## 2024-04-15 NOTE — PHYSICAL EXAM
[No Acute Distress] : no acute distress [Well Nourished] : well nourished [PERRL] : pupils equal round and reactive to light [EOMI] : extraocular movements intact [Normal Oropharynx] : the oropharynx was normal [Normal TMs] : both tympanic membranes were normal [Normal Nasal Mucosa] : the nasal mucosa was normal [No Lymphadenopathy] : no lymphadenopathy [Supple] : supple [Thyroid Normal, No Nodules] : the thyroid was normal and there were no nodules present [No Accessory Muscle Use] : no accessory muscle use [Clear to Auscultation] : lungs were clear to auscultation bilaterally [Regular Rhythm] : with a regular rhythm [Normal S1, S2] : normal S1 and S2 [No Murmur] : no murmur heard [No Edema] : there was no peripheral edema [Soft] : abdomen soft [Non Tender] : non-tender [Non-distended] : non-distended [No Masses] : no abdominal mass palpated [No HSM] : no HSM [Normal Bowel Sounds] : normal bowel sounds [Normal Supraclavicular Nodes] : no supraclavicular lymphadenopathy [Normal Posterior Cervical Nodes] : no posterior cervical lymphadenopathy [Normal Anterior Cervical Nodes] : no anterior cervical lymphadenopathy [Normal] : the cranial nerves were intact [Sensation Tactile Decrease] : light touch was intact [2+] : left 2+ [Normal Affect] : the affect was normal [Normal Insight/Judgement] : insight and judgment were intact [de-identified] : deferred - had gyn exam 12/23 [de-identified] : normal color and pigmentation o fnevi

## 2024-04-15 NOTE — HEALTH RISK ASSESSMENT
[Patient reported PAP Smear was normal] : Patient reported PAP Smear was normal [HIV Test offered] : HIV Test offered [PapSmearDate] : 12/23

## 2024-04-15 NOTE — ASSESSMENT
[FreeTextEntry1] : 33F c migraine headache her for cpe and cc of chronic daily headache in setting of advil use   [facility] - advised to check BW  physical ECG performed - ECG NSR  pt states utd with gyn exam advised annual optho exam and dental exam    rtc in1 year for cpe or prn

## 2024-04-15 NOTE — HISTORY OF PRESENT ILLNESS
[FreeTextEntry1] : CPE   chronic headache migraine headache fatigue   [de-identified] : Diet: been good  Exercise: regular    has worsening headache for past 6 weeks - almost daily - similar to migraine headache headache is 7-8/10 - take ibuprofen goes down to 2/10 but after ibuprofen wears off starts feelign headache again but doens' ttake anything for it  also started waking her up in the am   Tylenol doens't work for her   migraine headache - top of head - feels achy - usually 2-3 x per month   no tick exposure    states sleep is uninterrupted from 9pm to 4:45 am but not restorative to her  also feels exhausting - has been working6 days a week  only change is has been working more - has not had vacation for 4 months no si or hi

## 2024-04-15 NOTE — REVIEW OF SYSTEMS
[Headache] : headache [Negative] : Integumentary [Dizziness] : no dizziness [Fainting] : no fainting [Confusion] : no confusion [Memory Loss] : no memory loss [Unsteady Walking] : no ataxia [de-identified] : no si or hi

## 2024-04-17 ENCOUNTER — LABORATORY RESULT (OUTPATIENT)
Age: 34
End: 2024-04-17

## 2024-04-19 DIAGNOSIS — R53.83 OTHER FATIGUE: ICD-10-CM

## 2024-04-19 LAB
25(OH)D3 SERPL-MCNC: 81.3 NG/ML
ALBUMIN SERPL ELPH-MCNC: 4.8 G/DL
ALP BLD-CCNC: 61 U/L
ALT SERPL-CCNC: 12 U/L
ANION GAP SERPL CALC-SCNC: 10 MMOL/L
APPEARANCE: CLEAR
AST SERPL-CCNC: 15 U/L
BASOPHILS # BLD AUTO: 0.04 K/UL
BASOPHILS NFR BLD AUTO: 0.9 %
BILIRUB SERPL-MCNC: 0.6 MG/DL
BILIRUBIN URINE: NEGATIVE
BLOOD URINE: ABNORMAL
BUN SERPL-MCNC: 18 MG/DL
CALCIUM SERPL-MCNC: 9.7 MG/DL
CHLORIDE SERPL-SCNC: 105 MMOL/L
CHOLEST SERPL-MCNC: 216 MG/DL
CO2 SERPL-SCNC: 25 MMOL/L
COLOR: YELLOW
CREAT SERPL-MCNC: 0.71 MG/DL
DHEA-S SERPL-MCNC: 171 UG/DL
EGFR: 116 ML/MIN/1.73M2
EOSINOPHIL # BLD AUTO: 0.14 K/UL
EOSINOPHIL NFR BLD AUTO: 3.1 %
ESTIMATED AVERAGE GLUCOSE: 103 MG/DL
FERRITIN SERPL-MCNC: 19 NG/ML
FOLATE SERPL-MCNC: >20 NG/ML
FSH SERPL-MCNC: 6.7 IU/L
GLUCOSE QUALITATIVE U: NEGATIVE MG/DL
GLUCOSE SERPL-MCNC: 79 MG/DL
HBA1C MFR BLD HPLC: 5.2 %
HCT VFR BLD CALC: 39.9 %
HDLC SERPL-MCNC: 84 MG/DL
HGB BLD-MCNC: 13.1 G/DL
HIV1+2 AB SPEC QL IA.RAPID: NONREACTIVE
IMM GRANULOCYTES NFR BLD AUTO: 0.2 %
IRON SATN MFR SERPL: 24 %
IRON SERPL-MCNC: 74 UG/DL
KETONES URINE: NEGATIVE MG/DL
LDLC SERPL CALC-MCNC: 124 MG/DL
LEUKOCYTE ESTERASE URINE: ABNORMAL
LH SERPL-ACNC: 9 IU/L
LYMPHOCYTES # BLD AUTO: 1.69 K/UL
LYMPHOCYTES NFR BLD AUTO: 38 %
MAN DIFF?: NORMAL
MCHC RBC-ENTMCNC: 27.5 PG
MCHC RBC-ENTMCNC: 32.8 GM/DL
MCV RBC AUTO: 83.8 FL
MONOCYTES # BLD AUTO: 0.33 K/UL
MONOCYTES NFR BLD AUTO: 7.4 %
NEUTROPHILS # BLD AUTO: 2.24 K/UL
NEUTROPHILS NFR BLD AUTO: 50.4 %
NITRITE URINE: NEGATIVE
NONHDLC SERPL-MCNC: 132 MG/DL
PH URINE: 6.5
PLATELET # BLD AUTO: 276 K/UL
POTASSIUM SERPL-SCNC: 4.3 MMOL/L
PROT SERPL-MCNC: 7.1 G/DL
PROTEIN URINE: NEGATIVE MG/DL
RBC # BLD: 4.76 M/UL
RBC # FLD: 13.2 %
SODIUM SERPL-SCNC: 140 MMOL/L
SPECIFIC GRAVITY URINE: 1.02
T4 FREE SERPL-MCNC: 1.4 NG/DL
TESTOST FREE SERPL-MCNC: 0.6 PG/ML
TESTOST SERPL-MCNC: 18.6 NG/DL
TIBC SERPL-MCNC: 308 UG/DL
TRIGL SERPL-MCNC: 44 MG/DL
TSH SERPL-ACNC: 1.13 UIU/ML
UIBC SERPL-MCNC: 235 UG/DL
UROBILINOGEN URINE: 0.2 MG/DL
VIT B12 SERPL-MCNC: 1747 PG/ML
WBC # FLD AUTO: 4.45 K/UL

## 2024-04-19 RX ORDER — ELECTROLYTES/DEXTROSE
SOLUTION, ORAL ORAL
Refills: 0 | Status: ACTIVE | COMMUNITY
Start: 2024-04-19

## 2024-04-19 RX ORDER — ADHESIVE TAPE 3"X 2.3 YD
50 MCG TAPE, NON-MEDICATED TOPICAL
Qty: 60 | Refills: 2 | Status: DISCONTINUED | COMMUNITY
Start: 2024-04-19 | End: 2024-04-19

## 2024-05-13 NOTE — OB RN DELIVERY SUMMARY - NS_NEWBORNACONDIT_OBGYN_ALL_OB
Chief Complaint   Patient presents with   • Derm Problem   • Office Visit     Follow up        History of Present Illness:    Jihan Gomez presents with:    Complaint:  skin lesions - pt request skin cancer screening  Duration: months to years  Location:  entire body  Previous treatments:  none to date  Patient is here for skin cancer screening examination of multiple skin lesions on body today.      Personal/Family Hx of Skin Cancer   She does  have a personal history of skin cancer;  SCCIS on the left clavicular chest, excised 1/10/23.     She does  have a family history of skin cancer.     Physical Examination:    Total Body Examination of the feet, legs, buttocks, back, chest, abdomen, arms, hands, neck, scalp, and face revealed the following significant skin-related findings:   Scattered hyperpigmented macules in sun exposed areas consistent with solar lentigines  Multiple tan to hyperpigmented macules, no ugly duckling, no concerning dermoscopic features  Multiple compressible bright red papules consistent with cherry angiomas  Stuck on, verrucous papule with gyriform surface change consistent with seborrheic keratosis   Linear scar, well healed without evidence of tumor recurrence.   Tan to hyperpigmented macule on right forearm.     Assessment and Plan:    Diagnoses and all orders for this visit:  Sun-damaged skin  Skin cancer screening  Lentigines  Multiple nevi  Cherry angioma  Seborrheic keratosis  Scar  History of squamous cell carcinoma in situ (SCCIS)  Family history of skin cancer    Patient has chronic actinic damage (ie, chronic sun damage) which increases risk for skin cancer.  Sunscreen recommendations provided - active ingredients of zinc or titanium dioxide.      Procedures Performed:    None    Return in about 6 months (around 11/13/2024) for TBSE.     On 5/13/2024, Chelle BELTRAN LPN scribed the services personally performed by Rajendra Acuna MD  The documentation recorded by the  scribe accurately and completely reflects the service(s) I personally performed and the decisions made by me.      [place]

## 2024-06-07 ENCOUNTER — NON-APPOINTMENT (OUTPATIENT)
Age: 34
End: 2024-06-07